# Patient Record
(demographics unavailable — no encounter records)

---

## 2024-10-31 NOTE — PHYSICAL EXAM
[Alert] : alert [Well Nourished] : well nourished [No Acute Distress] : no acute distress [Well Developed] : well developed [Normal Sclera/Conjunctiva] : normal sclera/conjunctiva [EOMI] : extra ocular movement intact [No Proptosis] : no proptosis [Normal Oropharynx] : the oropharynx was normal [Thyroid Not Enlarged] : the thyroid was not enlarged [No Thyroid Nodules] : no palpable thyroid nodules [No Respiratory Distress] : no respiratory distress [No Accessory Muscle Use] : no accessory muscle use [Clear to Auscultation] : lungs were clear to auscultation bilaterally [Normal S1, S2] : normal S1 and S2 [Normal Rate] : heart rate was normal [Regular Rhythm] : with a regular rhythm [No Edema] : no peripheral edema [Normal Bowel Sounds] : normal bowel sounds [Not Tender] : non-tender [Not Distended] : not distended [Soft] : abdomen soft [Normal Anterior Cervical Nodes] : no anterior cervical lymphadenopathy [No Spinal Tenderness] : no spinal tenderness [Scoliosis] : scoliosis present [No Stigmata of Cushings Syndrome] : no stigmata of Cushings Syndrome [Normal Gait] : normal gait [Normal Strength/Tone] : muscle strength and tone were normal [Normal Reflexes] : deep tendon reflexes were 2+ and symmetric [No Tremors] : no tremors [Oriented x3] : oriented to person, place, and time [de-identified] : Scoliosis

## 2024-10-31 NOTE — HISTORY OF PRESENT ILLNESS
[FreeTextEntry1] :  . Pt is generally in overall good health. Pt had an L1 compression fracture due to a fall in 1981. Pt is on Keppra for seizures. Pt last had a seizure in 2009, has not had a seizure since. Pt had her first bone density in 05/2021 which indicated osteopenia. The results and treatment of options was never discussed w/pt. Bone density has decreased since 2021. BMD 02/09/2024 indicates comparison to 2021. Spine:   L1-4, -2.1, osteopenia, suspect DJD. Total hip: -2.0, osteopenia, -3.2% vs 2021. Femoral neck: -2.7, osteoporosis, -5.3% vs 2021. Proximal radius: not performed. FRAX: 12/2.9. BMD results were discussed with the patient.  History of scoliosis since childhood . Patient appears to have lost some height presumably due to disc disease Patient is post-menopausal since age 50's. No HRT.  Pt has no Hx of kidney stones or calcium issues. No Hx of anorexia nervosa, premature menopause, amenorrhea during reproductive years. No h/o celiac disease, malabsorption, nutritional deficiencies. No ulcers or bleeding. No other unusual risks for osteoporosis such as FHx hip fracture, suggestion of OI. No Hx of RT. No chronic prednisone use. No Hx of Paget's disease. No Hx of DVT or PE. Denies EtOH use, excessive soda intake, unusual dietary restrictions. Up to date with routine care.   Never smoker.  Pt takes Vitamin D 2000 IU.   PMHx: Denies Paget's Dz, hyperparathyroidism, diabetes and chronic steroid use. PSHx: Denies hip surgery and thoracic or lumbar vertebroplasty.   Sees DDS every 6 months. No major dental work planned.  Labs: March 2024 Creatinine: 0.58 Calcium: 9.3  Labs: December 2023  Creatinine: 0.55 Calcium: 8.9 Vitamin D: 31 TSH: 1.27 A1c: 5.6%

## 2024-10-31 NOTE — ASSESSMENT
[Bisphosphonate Therapy] : Risks and benefits of bisphosphonate therapy were  discussed with the patient including gastroesophageal irritation, osteonecrosis of the jaw, and atypical femur fractures, and acute phase reaction [FreeTextEntry1] : 63 y/o female is referred for initial consultation for osteoporosis.    Pt is generally in overall good health. Pt had an L1 compression fracture due to a fall in 1981. Pt is on Keppra for seizures. Pt last had a seizure in 2009, has not had a seizure since. Pt had her first bone density in 05/2021 which indicated osteopenia. The results and treatment of options was never discussed w/pt. Bone density has decreased since 2021. BMD 02/09/2024 indicates comparison to 2021. Spine:   L1-4, -2.1, osteopenia, suspect DJD. Total hip: -2.0, osteopenia, -3.2% vs 2021. Femoral neck: -2.7, osteoporosis, -5.3% vs 2021. Proximal radius: not performed. FRAX: 12/2.9. BMD results were discussed with the patient.   Given the patient's history and BMD, the patient is at a  moderately increased risk of future fracture. Options of medical therapy were discussed in detail including risks and benefits.   I discussed Rx with bisphosphonate therapy, including IV Reclast. Risks and benefits of bisphosphonate therapy were discussed including minor aches and pains, heartburn, gastroesophageal irritation (excluding IV Reclast), osteonecrosis of the jaw (ONJ), atypical femur fractures, and acute phase reaction (w/ IV Reclast only). The patient would benefit from bisphosphonate therapy with the expectation of a drug holiday within 3-5 years.   I discussed Rx with twice a year Prolia injection. Risks and benefits of Prolia discussed including ONJ and atypical femur fracture. I discussed that the patient cannot stop Prolia without expecting rapid bone loss and an increase in risk for future fracture unless they transition to another Rx. Furthermore, there is 10 year safety data for Prolia.  Patient is not a candidate for anabolic therapy.  I discussed estrogen replacement therapy the patient has no menopausal symptoms and I would not recommend.HRT. All questions were answered. Rx information handout provided. The patient understands and elects to start Risedronate.   I have requested a basic metabolic work-up to help exclude secondary causes of osteoporosis such as parathyroid excess.  I recommended we can monitor response to therapy initially with biochemical markers of bone turnover and then repeat bone density in 1 year.  I discussed that weight bearing exercises, cardiovascular activities, and diet are beneficial to overall health, but are not adequate for bone density increase or alternative to osteoporosis medication. I recommend supplementing with no more than 500 mg of Ca and 1000 IU of Vit D3.   Draw labs today including CMP, PTH, Vit D, and CTx.   Follow up in 4 months  Repeat BMD in 1 year after starting therapy. Allan MS, Rayne SL, Kenn KL, Sahil EM, Jo Ann KG,  AJ, Kristal ES. The clinician's guide to prevention and treatment of osteoporosis. Osteoporos Int. 2022 Oct;33(10):5635-0187. .

## 2024-10-31 NOTE — REASON FOR VISIT
[Consultation] : a consultation visit [Osteoporosis] : osteoporosis [FreeTextEntry2] : Dr. Jones Hines MD

## 2024-10-31 NOTE — END OF VISIT
[FreeTextEntry3] :  This note was written by Isabela Bourgeois on (October 31, 2024) acting as a medical scribe for Dr. Manriquez This note was authored by the medical scribe for me. I have reviewed, edited, and revised the note as needed. I am in agreement with the exam findings, imaging findings, and treatment plan.  Zay Manriquez MD

## 2024-10-31 NOTE — PHYSICAL EXAM
[Alert] : alert [Well Nourished] : well nourished [No Acute Distress] : no acute distress [Well Developed] : well developed [Normal Sclera/Conjunctiva] : normal sclera/conjunctiva [EOMI] : extra ocular movement intact [No Proptosis] : no proptosis [Normal Oropharynx] : the oropharynx was normal [Thyroid Not Enlarged] : the thyroid was not enlarged [No Thyroid Nodules] : no palpable thyroid nodules [No Respiratory Distress] : no respiratory distress [No Accessory Muscle Use] : no accessory muscle use [Clear to Auscultation] : lungs were clear to auscultation bilaterally [Normal S1, S2] : normal S1 and S2 [Normal Rate] : heart rate was normal [Regular Rhythm] : with a regular rhythm [No Edema] : no peripheral edema [Normal Bowel Sounds] : normal bowel sounds [Not Tender] : non-tender [Not Distended] : not distended [Soft] : abdomen soft [Normal Anterior Cervical Nodes] : no anterior cervical lymphadenopathy [No Spinal Tenderness] : no spinal tenderness [Scoliosis] : scoliosis present [No Stigmata of Cushings Syndrome] : no stigmata of Cushings Syndrome [Normal Gait] : normal gait [Normal Strength/Tone] : muscle strength and tone were normal [Normal Reflexes] : deep tendon reflexes were 2+ and symmetric [No Tremors] : no tremors [Oriented x3] : oriented to person, place, and time [de-identified] : Scoliosis

## 2024-10-31 NOTE — CONSULT LETTER
[Dear  ___] : Dear  [unfilled], [Consult Letter:] : I had the pleasure of evaluating your patient, [unfilled]. [Consult Closing:] : Thank you very much for allowing me to participate in the care of this patient.  If you have any questions, please do not hesitate to contact me. [Sincerely,] : Sincerely, [FreeTextEntry2] : Dr. Jones Hines MD  [FreeTextEntry3] : Dr. Zay Manriquez MD

## 2024-10-31 NOTE — ASSESSMENT
[Bisphosphonate Therapy] : Risks and benefits of bisphosphonate therapy were  discussed with the patient including gastroesophageal irritation, osteonecrosis of the jaw, and atypical femur fractures, and acute phase reaction [FreeTextEntry1] : 63 y/o female is referred for initial consultation for osteoporosis.    Pt is generally in overall good health. Pt had an L1 compression fracture due to a fall in 1981. Pt is on Keppra for seizures. Pt last had a seizure in 2009, has not had a seizure since. Pt had her first bone density in 05/2021 which indicated osteopenia. The results and treatment of options was never discussed w/pt. Bone density has decreased since 2021. BMD 02/09/2024 indicates comparison to 2021. Spine:   L1-4, -2.1, osteopenia, suspect DJD. Total hip: -2.0, osteopenia, -3.2% vs 2021. Femoral neck: -2.7, osteoporosis, -5.3% vs 2021. Proximal radius: not performed. FRAX: 12/2.9. BMD results were discussed with the patient.   Given the patient's history and BMD, the patient is at a  moderately increased risk of future fracture. Options of medical therapy were discussed in detail including risks and benefits.   I discussed Rx with bisphosphonate therapy, including IV Reclast. Risks and benefits of bisphosphonate therapy were discussed including minor aches and pains, heartburn, gastroesophageal irritation (excluding IV Reclast), osteonecrosis of the jaw (ONJ), atypical femur fractures, and acute phase reaction (w/ IV Reclast only). The patient would benefit from bisphosphonate therapy with the expectation of a drug holiday within 3-5 years.   I discussed Rx with twice a year Prolia injection. Risks and benefits of Prolia discussed including ONJ and atypical femur fracture. I discussed that the patient cannot stop Prolia without expecting rapid bone loss and an increase in risk for future fracture unless they transition to another Rx. Furthermore, there is 10 year safety data for Prolia.  Patient is not a candidate for anabolic therapy.  I discussed estrogen replacement therapy the patient has no menopausal symptoms and I would not recommend.HRT. All questions were answered. Rx information handout provided. The patient understands and elects to start Risedronate.   I have requested a basic metabolic work-up to help exclude secondary causes of osteoporosis such as parathyroid excess.  I recommended we can monitor response to therapy initially with biochemical markers of bone turnover and then repeat bone density in 1 year.  I discussed that weight bearing exercises, cardiovascular activities, and diet are beneficial to overall health, but are not adequate for bone density increase or alternative to osteoporosis medication. I recommend supplementing with no more than 500 mg of Ca and 1000 IU of Vit D3.   Draw labs today including CMP, PTH, Vit D, and CTx.   Follow up in 4 months  Repeat BMD in 1 year after starting therapy. Allan MS, Rayne SL, Kenn KL, Sahil EM, Jo Ann KG,  AJ, Kristal ES. The clinician's guide to prevention and treatment of osteoporosis. Osteoporos Int. 2022 Oct;33(10):1264-9718. .

## 2024-10-31 NOTE — PROCEDURE
[FreeTextEntry1] : Bone Mineral Density: 02/09/2024  Indication: Comparison to 2021  Spine:   L1-4, -2.1, osteopenia, suspect DJD Total hip: -2.0, osteopenia, -3.2% vs 2021  Femoral neck: -2.7, osteoporosis, -5.3% vs 2021 Proximal radius: not performed FRAX: 12/2.9

## 2024-11-12 NOTE — PHYSICAL EXAM
[Normocephalic] : normocephalic [Atraumatic] : atraumatic [EOMI] : extra ocular movement intact [Sclera nonicteric] : sclera nonicteric [Supple] : supple [No Supraclavicular Adenopathy] : no supraclavicular adenopathy [No Cervical Adenopathy] : no cervical adenopathy [No Thyromegaly] : no thyromegaly [Clear to Auscultation Bilat] : clear to auscultation bilaterally [Normal Sinus Rhythm] : normal sinus rhythm [Normal S1, S2] : normal S1 and S2 [No Gallops] : no gallops [No Rubs] : no pericardial rub [Examined in the supine and seated position] : examined in the supine and seated position [Bra Size: ___] : Bra Size: [unfilled] [No dominant masses] : no dominant masses in right breast  [No dominant masses] : no dominant masses left breast [No Nipple Retraction] : no left nipple retraction [No Nipple Discharge] : no left nipple discharge [No Axillary Lymphadenopathy] : no left axillary lymphadenopathy [No Edema] : no edema [No Rashes] : no rashes [No Ulceration] : no ulceration [de-identified] : s/p bilateral breast augmentation [de-identified] : partial nipple retraction, easily everted on clinical exam

## 2024-11-12 NOTE — CONSULT LETTER
[Dear  ___] : Dear  [unfilled], [Consult Letter:] : I had the pleasure of evaluating your patient, [unfilled]. [Please see my note below.] : Please see my note below. [Consult Closing:] : Thank you very much for allowing me to participate in the care of this patient.  If you have any questions, please do not hesitate to contact me. [Sincerely,] : Sincerely, [FreeTextEntry3] : Susan M. Palleschi, MD, FACS Division of Breast Surgery Director, Breast Surgery [address] [address] (Phone) [phone] (Fax) [phone]  [DrKee  ___] : Dr. OCAMPO

## 2024-11-12 NOTE — PHYSICAL EXAM
[Normocephalic] : normocephalic [Atraumatic] : atraumatic [EOMI] : extra ocular movement intact [Sclera nonicteric] : sclera nonicteric [Supple] : supple [No Supraclavicular Adenopathy] : no supraclavicular adenopathy [No Cervical Adenopathy] : no cervical adenopathy [No Thyromegaly] : no thyromegaly [Clear to Auscultation Bilat] : clear to auscultation bilaterally [Normal Sinus Rhythm] : normal sinus rhythm [Normal S1, S2] : normal S1 and S2 [No Gallops] : no gallops [No Rubs] : no pericardial rub [Examined in the supine and seated position] : examined in the supine and seated position [Bra Size: ___] : Bra Size: [unfilled] [No dominant masses] : no dominant masses in right breast  [No dominant masses] : no dominant masses left breast [No Nipple Retraction] : no left nipple retraction [No Nipple Discharge] : no left nipple discharge [No Axillary Lymphadenopathy] : no left axillary lymphadenopathy [No Edema] : no edema [No Rashes] : no rashes [No Ulceration] : no ulceration [de-identified] : s/p bilateral breast augmentation [de-identified] : partial nipple retraction, easily everted on clinical exam

## 2024-11-12 NOTE — CONSULT LETTER
[Dear  ___] : Dear  [unfilled], [Consult Letter:] : I had the pleasure of evaluating your patient, [unfilled]. [Please see my note below.] : Please see my note below. [Consult Closing:] : Thank you very much for allowing me to participate in the care of this patient.  If you have any questions, please do not hesitate to contact me. [Sincerely,] : Sincerely, [FreeTextEntry3] : Susan M. Palleschi, MD, FACS Division of Breast Surgery Director, Breast Surgery [address] [address] (Phone) [phone] (Fax) [phone]  [DrKee  ___] : Dr. OCAMPO

## 2024-11-12 NOTE — ASSESSMENT
[FreeTextEntry1] : History of breast augmentation. s/p right breast core biopsy reportedly benign. Clinical breast exam negative. I personally reviewed her imaging.  1. Annual mammogram and ultrasound due 8/2025. 2. Follow up visit as needed. 3. Advise monthly self breast examinations and to contact me with any concerns.

## 2024-11-12 NOTE — HISTORY OF PRESENT ILLNESS
[FreeTextEntry1] : Patient is here for a consultation for She was referred to me by Dr. Yobani Tomlinson (personal friend). She has no family history of breast or ovarian cancer. She is s/p saline breast augmentation. She is s/p right core needle biopsy reportedly benign (Dr. Jaime, [facility]). 8/9/2024 Bilateral mammogram: Dense breasts. A nodule containing a biopsy clip is noted within the far posterior upper right breast seen on the MLO view only. Bilateral ultrasound:  At right 12:00 (N10cm) there is a circumscribed elongated 1.1 x 0.2 x 0.7 cm hypoechoic nodule containing a biopsy clip. Bilateral implants noted. Bilateral nodularity appears overall symmetric. BI-RADS 2. She reports a long standing history of right nipple inversion. She was able to breast feed. She denies any breast masses, skin changes or nipple discharge. DORINDA estimated lifetime risk of breast cancer 9.3 %

## 2024-11-12 NOTE — CONSULT LETTER
[Dear  ___] : Dear  [unfilled], [Consult Letter:] : I had the pleasure of evaluating your patient, [unfilled]. [Please see my note below.] : Please see my note below. [Consult Closing:] : Thank you very much for allowing me to participate in the care of this patient.  If you have any questions, please do not hesitate to contact me. [Sincerely,] : Sincerely, [FreeTextEntry3] : Susan M. Palleschi, MD, FACS Division of Breast Surgery Director, Breast Surgery [address] [address] (Phone) [phone] (Fax) [phone]  [DrKee  ___] : Dr. OCAMPO

## 2024-11-12 NOTE — CONSULT LETTER
[Dear  ___] : Dear  [unfilled], [Consult Letter:] : I had the pleasure of evaluating your patient, [unfilled]. [Please see my note below.] : Please see my note below. [Consult Closing:] : Thank you very much for allowing me to participate in the care of this patient.  If you have any questions, please do not hesitate to contact me. [Sincerely,] : Sincerely, [FreeTextEntry3] : Susan M. Palleschi, MD, FACS Division of Breast Surgery Director, Breast Surgery [address] [address] (Phone) [phone] (Fax) [phone]  [DrKee  ___] : Dr. OCAMPO

## 2024-11-12 NOTE — PHYSICAL EXAM
[Normocephalic] : normocephalic [Atraumatic] : atraumatic [EOMI] : extra ocular movement intact [Sclera nonicteric] : sclera nonicteric [Supple] : supple [No Supraclavicular Adenopathy] : no supraclavicular adenopathy [No Cervical Adenopathy] : no cervical adenopathy [No Thyromegaly] : no thyromegaly [Clear to Auscultation Bilat] : clear to auscultation bilaterally [Normal Sinus Rhythm] : normal sinus rhythm [Normal S1, S2] : normal S1 and S2 [No Gallops] : no gallops [No Rubs] : no pericardial rub [Examined in the supine and seated position] : examined in the supine and seated position [Bra Size: ___] : Bra Size: [unfilled] [No dominant masses] : no dominant masses in right breast  [No dominant masses] : no dominant masses left breast [No Nipple Retraction] : no left nipple retraction [No Nipple Discharge] : no left nipple discharge [No Axillary Lymphadenopathy] : no left axillary lymphadenopathy [No Edema] : no edema [No Rashes] : no rashes [No Ulceration] : no ulceration [de-identified] : s/p bilateral breast augmentation [de-identified] : partial nipple retraction, easily everted on clinical exam

## 2024-11-12 NOTE — PHYSICAL EXAM
[Normocephalic] : normocephalic [Atraumatic] : atraumatic [EOMI] : extra ocular movement intact [Sclera nonicteric] : sclera nonicteric [Supple] : supple [No Supraclavicular Adenopathy] : no supraclavicular adenopathy [No Cervical Adenopathy] : no cervical adenopathy [No Thyromegaly] : no thyromegaly [Clear to Auscultation Bilat] : clear to auscultation bilaterally [Normal Sinus Rhythm] : normal sinus rhythm [Normal S1, S2] : normal S1 and S2 [No Gallops] : no gallops [No Rubs] : no pericardial rub [Examined in the supine and seated position] : examined in the supine and seated position [Bra Size: ___] : Bra Size: [unfilled] [No dominant masses] : no dominant masses in right breast  [No dominant masses] : no dominant masses left breast [No Nipple Retraction] : no left nipple retraction [No Nipple Discharge] : no left nipple discharge [No Axillary Lymphadenopathy] : no left axillary lymphadenopathy [No Edema] : no edema [No Rashes] : no rashes [No Ulceration] : no ulceration [de-identified] : s/p bilateral breast augmentation [de-identified] : partial nipple retraction, easily everted on clinical exam

## 2025-01-28 NOTE — HISTORY OF PRESENT ILLNESS
[FreeTextEntry1] : 3/24/23: She denies having any seizures since the last visit. There have been no episodes of waking up with tongue bites, urinary incontinence, or unexplained muscle soreness. There have been no staring spells, lapses in time, myoclonus, episodes of intense muna vu, olfactory hallucinations or rising epigastric sensations.  She denies having any significant mood disturbance.  She is still taking sertraline 50 mg but is trying to wean off very slowly and is now taking 50 mg every other day.  She has chronic sleep difficulties, partially due to itching.  She continues to take Keppra 1000 mg hs. She reports compliance and denies side effects. She is not taking the extended release.   12/6/23: She says that everything is going well. She denies having any seizures or events suspicious for seizures over the last year. Rarely (less than 5 times in the last year) she has a fleeting sensation of loss of equilibrium without any change in consciousness. She reports compliance with medication and denies having any side effects. She denies mood disturbance.  1/28/25: She has not had any seizures over the last year. There have not been any episodes suspicious for seizures.  She has not woken up with any tongue bites or urinary incontinence. She is taking levetiracetam 500 mg q12. She denies side effects. She is taking sertraline. She has been weaning down extremely slowing and is now taking 50 mg on a Saturday and 25 mg on Wednesday. She is sleeping well.

## 2025-01-28 NOTE — DATA REVIEWED
[de-identified] : MRI brain with and without contrast 1/18/10: 1. Incidental 4 mm right posterior temporal subcortical T2/FLAIR hyperintensity. This is a nonspecific finding in a patient of this age, most likely the incidental sequela of previous inflammatory, traumatic or ischemic change. 2. Otherwise, normal MRI of the brain. No infarct, mass, hemorrhage, hydrocephalus or enhancement is noted.  MRI brain with and without contrast 10/22/21: No evidence for intracranial mass or mass effect. No abnormal enhancement.  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 Please correlate with clinical exam findings, EEG, etc.   MRA brain 10/22/21:unremarkable [de-identified] : EEG 1/15/2000: normal\par  EEG 72 hour ambulatory 4/16/10-4/19/10: normal

## 2025-01-28 NOTE — DATA REVIEWED
[de-identified] : MRI brain with and without contrast 1/18/10: 1. Incidental 4 mm right posterior temporal subcortical T2/FLAIR hyperintensity. This is a nonspecific finding in a patient of this age, most likely the incidental sequela of previous inflammatory, traumatic or ischemic change. 2. Otherwise, normal MRI of the brain. No infarct, mass, hemorrhage, hydrocephalus or enhancement is noted.  MRI brain with and without contrast 10/22/21: No evidence for intracranial mass or mass effect. No abnormal enhancement.  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 Please correlate with clinical exam findings, EEG, etc.   MRA brain 10/22/21:unremarkable [de-identified] : EEG 1/15/2000: normal\par  EEG 72 hour ambulatory 4/16/10-4/19/10: normal

## 2025-01-28 NOTE — DISCUSSION/SUMMARY
[FreeTextEntry1] : Ms. Leo is a 62 year old woman with a history of two seizures (1983) and (2010). They both occurred in the setting of sleep deprivation and were convulsions. There was some warning but no clear focal symptoms. She has been stable without seizures for many years.   -Continue Keppra. She is taking 1000 mg at night. I explained that this version is not long acting. She can either take levetiracetam ER 1000 mg hs or levetiracetam 500 mg BID. She would prefer the latter. -Check level with next physical  -Father passed away from hemorrhagic stroke and had a history of low blood pressure. Unclear if he had an aneurysm rupture. Prior MRA in 2021 was unremarkable as was a repeat in 2024  -Previous MRI brain in 2010 showed a small area of T2/FLAIR hyperintensity in right posterior temporal lobe (done within few days after seizure) - possibly post ictal effect. -MRI brain in October 2021 showed some findings suspicious for left MTS. However, it does not sound as if she has all of the associated changes with MTS. A f/u MRI brain in 2024 showed stable minimal ischemic changes.  f/u 1 year